# Patient Record
Sex: FEMALE | Race: BLACK OR AFRICAN AMERICAN
[De-identification: names, ages, dates, MRNs, and addresses within clinical notes are randomized per-mention and may not be internally consistent; named-entity substitution may affect disease eponyms.]

---

## 2017-01-26 ENCOUNTER — HOSPITAL ENCOUNTER (EMERGENCY)
Dept: HOSPITAL 17 - NEPB | Age: 27
Discharge: HOME | End: 2017-01-26
Payer: COMMERCIAL

## 2017-01-26 VITALS
HEART RATE: 111 BPM | RESPIRATION RATE: 16 BRPM | SYSTOLIC BLOOD PRESSURE: 126 MMHG | TEMPERATURE: 98.3 F | DIASTOLIC BLOOD PRESSURE: 75 MMHG | OXYGEN SATURATION: 98 %

## 2017-01-26 VITALS — WEIGHT: 165.35 LBS | HEIGHT: 68 IN | BODY MASS INDEX: 25.06 KG/M2
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
## 2017-01-26 DIAGNOSIS — S39.012A: ICD-10-CM
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
## 2017-01-26 DIAGNOSIS — M54.5: Primary | ICD-10-CM

## 2017-01-26 DIAGNOSIS — M25.551: ICD-10-CM
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
## 2017-01-26 DIAGNOSIS — S70.10XA: ICD-10-CM

## 2017-01-26 PROCEDURE — 72110 X-RAY EXAM L-2 SPINE 4/>VWS: CPT

## 2017-01-26 PROCEDURE — 73502 X-RAY EXAM HIP UNI 2-3 VIEWS: CPT

## 2017-01-26 PROCEDURE — 96372 THER/PROPH/DIAG INJ SC/IM: CPT

## 2017-01-26 PROCEDURE — 99283 EMERGENCY DEPT VISIT LOW MDM: CPT

## 2017-01-26 PROCEDURE — 84703 CHORIONIC GONADOTROPIN ASSAY: CPT
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
## 2018-04-27 ENCOUNTER — HOSPITAL ENCOUNTER (EMERGENCY)
Dept: HOSPITAL 17 - NEPE | Age: 28
Discharge: HOME | End: 2018-04-27
Payer: COMMERCIAL

## 2018-04-27 VITALS
TEMPERATURE: 102.3 F | OXYGEN SATURATION: 98 % | DIASTOLIC BLOOD PRESSURE: 81 MMHG | RESPIRATION RATE: 21 BRPM | HEART RATE: 107 BPM | SYSTOLIC BLOOD PRESSURE: 116 MMHG

## 2018-04-27 VITALS — BODY MASS INDEX: 30.07 KG/M2 | HEIGHT: 68 IN | WEIGHT: 198.42 LBS

## 2018-04-27 VITALS — RESPIRATION RATE: 20 BRPM | DIASTOLIC BLOOD PRESSURE: 72 MMHG | SYSTOLIC BLOOD PRESSURE: 123 MMHG

## 2018-04-27 VITALS — TEMPERATURE: 98.9 F

## 2018-04-27 DIAGNOSIS — R55: Primary | ICD-10-CM

## 2018-04-27 DIAGNOSIS — J11.1: ICD-10-CM

## 2018-04-27 LAB
ALBUMIN SERPL-MCNC: 3 GM/DL (ref 3.4–5)
ALP SERPL-CCNC: 61 U/L (ref 45–117)
ALT SERPL-CCNC: 16 U/L (ref 10–53)
AST SERPL-CCNC: 13 U/L (ref 15–37)
BASOPHILS # BLD AUTO: 0 TH/MM3 (ref 0–0.2)
BASOPHILS NFR BLD: 0.8 % (ref 0–2)
BILIRUB SERPL-MCNC: 0.2 MG/DL (ref 0.2–1)
BUN SERPL-MCNC: 8 MG/DL (ref 7–18)
CALCIUM SERPL-MCNC: 7.7 MG/DL (ref 8.5–10.1)
CHLORIDE SERPL-SCNC: 109 MEQ/L (ref 98–107)
CREAT SERPL-MCNC: 0.94 MG/DL (ref 0.5–1)
EOSINOPHIL # BLD: 0.1 TH/MM3 (ref 0–0.4)
EOSINOPHIL NFR BLD: 1.6 % (ref 0–4)
ERYTHROCYTE [DISTWIDTH] IN BLOOD BY AUTOMATED COUNT: 12.7 % (ref 11.6–17.2)
GFR SERPLBLD BASED ON 1.73 SQ M-ARVRAT: 86 ML/MIN (ref 89–?)
GLUCOSE SERPL-MCNC: 87 MG/DL (ref 74–106)
HCO3 BLD-SCNC: 24.7 MEQ/L (ref 21–32)
HCT VFR BLD CALC: 40.4 % (ref 35–46)
HGB BLD-MCNC: 13.1 GM/DL (ref 11.6–15.3)
LYMPHOCYTES # BLD AUTO: 1.3 TH/MM3 (ref 1–4.8)
LYMPHOCYTES NFR BLD AUTO: 33 % (ref 9–44)
MCH RBC QN AUTO: 27.2 PG (ref 27–34)
MCHC RBC AUTO-ENTMCNC: 32.4 % (ref 32–36)
MCV RBC AUTO: 83.8 FL (ref 80–100)
MONOCYTE #: 0.7 TH/MM3 (ref 0–0.9)
MONOCYTES NFR BLD: 16.6 % (ref 0–8)
NEUTROPHILS # BLD AUTO: 2 TH/MM3 (ref 1.8–7.7)
NEUTROPHILS NFR BLD AUTO: 48 % (ref 16–70)
PLATELET # BLD: 214 TH/MM3 (ref 150–450)
PMV BLD AUTO: 9.2 FL (ref 7–11)
PROT SERPL-MCNC: 6.6 GM/DL (ref 6.4–8.2)
RBC # BLD AUTO: 4.82 MIL/MM3 (ref 4–5.3)
SODIUM SERPL-SCNC: 142 MEQ/L (ref 136–145)
WBC # BLD AUTO: 4.1 TH/MM3 (ref 4–11)

## 2018-04-27 PROCEDURE — 96361 HYDRATE IV INFUSION ADD-ON: CPT

## 2018-04-27 PROCEDURE — 99284 EMERGENCY DEPT VISIT MOD MDM: CPT

## 2018-04-27 PROCEDURE — 82150 ASSAY OF AMYLASE: CPT

## 2018-04-27 PROCEDURE — 85025 COMPLETE CBC W/AUTO DIFF WBC: CPT

## 2018-04-27 PROCEDURE — 83690 ASSAY OF LIPASE: CPT

## 2018-04-27 PROCEDURE — 87804 INFLUENZA ASSAY W/OPTIC: CPT

## 2018-04-27 PROCEDURE — 84703 CHORIONIC GONADOTROPIN ASSAY: CPT

## 2018-04-27 PROCEDURE — 80053 COMPREHEN METABOLIC PANEL: CPT

## 2018-04-27 PROCEDURE — 96374 THER/PROPH/DIAG INJ IV PUSH: CPT
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
